# Patient Record
(demographics unavailable — no encounter records)

---

## 2018-01-26 NOTE — EDM.PDOC
ED HPI GENERAL MEDICAL PROBLEM





- General


Chief Complaint: Respiratory Problem


Stated Complaint: ASTHMA ATTACK


Time Seen by Provider: 01/26/18 17:22


Source of Information: Reports: Patient


History Limitations: Reports: No Limitations





- History of Present Illness


INITIAL COMMENTS - FREE TEXT/NARRATIVE: 





29-year-old female presents for evaluation and treatment of wheezing and 

shortness of breath. Patient reports her symptoms started yesterday. Current 

symptoms include wheezing, shortness of breath, fevers, headaches, bodyaches, 

runny nose and a cough. She has a history of asthma and has been using her 

albuterol inhaler. Last use was about 2 hours ago. Denies any nausea, vomiting 

or abdominal pain.





She did have an influenza vaccine this season. 





Works as a .


  ** Generalized


Pain Score (Numeric/FACES): 5





- Related Data


 Allergies











Allergy/AdvReac Type Severity Reaction Status Date / Time


 


No Known Allergies Allergy   Verified 01/26/18 17:27











Home Meds: 


 Home Meds





Albuterol [IJP: Albuterol] 2.5 mg .XX Q4HR #25 unit 01/26/18 [Rx]


Oseltamivir [Tamiflu] 75 mg PO BID #9 cap 01/26/18 [Rx]


Prenatal Vit #108/Iron/FA [Prenatal One Tablet] 1 tab PO DAILY 01/26/18 [History

]











Past Medical History





- Past Health History


Medical/Surgical History: Denies Medical/Surgical History


Respiratory History: Reports: Asthma


Musculoskeletal History: Reports: Arthritis


Neurological History: Reports: Migraines





Social & Family History





- Family History


Family Medical History: Noncontributory





- Tobacco Use


Smoking Status *Q: Never Smoker


Years of Tobacco use: 2


Packs/Tins Daily: 0.1





- Recreational Drug Use


Recreational Drug Use: No





ED ROS GENERAL





- Review of Systems


Review Of Systems: See Below


Constitutional: Reports: Fever, Chills, Malaise, Fatigue, Other (bodyaches)


HEENT: Denies: Ear Pain, Throat Pain


Respiratory: Reports: Shortness of Breath, Wheezing, Cough


GI/Abdominal: Denies: Abdominal Pain, Nausea, Vomiting


Neurological: Reports: Headache





ED EXAM, GENERAL





- Physical Exam


Exam: See Below


Exam Limited By: No Limitations


General Appearance: Alert, WD/WN, Mild Distress


Eye Exam: Bilateral Eye: Conjunctival Injection


Ears: Normal External Exam, Normal Canal, Hearing Grossly Normal, Normal TMs


Nose: Normal Inspection


Throat/Mouth: Normal Inspection, Normal Lips, Normal Voice, No Airway Compromise


Neck: Normal Inspection


Respiratory/Chest: No Respiratory Distress, Wheezing (diffuse inspiratory and 

expiratory wheezing)


Cardiovascular: Normal Peripheral Pulses, No Murmur, Tachycardia


GI/Abdominal: Soft, Non-Tender


Neurological: Alert, Oriented, Normal Cognition


Psychiatric: Normal Affect, Normal Mood


Skin Exam: Diaphoretic, Increased Warmth





Course





- Vital Signs


Last Recorded V/S: 


 Last Vital Signs











Temp  36.6 C   01/26/18 17:24


 


Pulse  111 H  01/26/18 17:24


 


Resp  22 H  01/26/18 17:24


 


BP  139/89   01/26/18 17:24


 


Pulse Ox  99   01/26/18 18:20














- Orders/Labs/Meds


Orders: 


 Active Orders 24 hr











 Category Date Time Status


 


 Peripheral IV Care [RC] .AS DIRECTED Care  01/26/18 17:41 Active


 


 RT Aerosol Therapy [RC] ASDIRECTED Care  01/26/18 17:41 Active


 


 Chest 2V [CR] Stat Exams  01/26/18 17:41 Taken


 


 Peripheral IV Insertion Adult [OM.PC] Routine Oth  01/26/18 17:40 Ordered


 


 Pulse Oximetry Continuous Monitoring [OM.PC] Routine Oth  01/26/18 17:41 Active











Labs: 


 Laboratory Tests











  01/26/18 01/26/18 Range/Units





  17:45 17:45 


 


WBC   6.56  (3.98-10.04)  K/mm3


 


RBC   4.61  (3.98-5.22)  M/mm3


 


Hgb   12.8  (11.2-15.7)  gm/L


 


Hct   37.6  (34.1-44.9)  %


 


MCV   81.6  (79.4-94.8)  fl


 


MCH   27.8  (25.6-32.2)  pg


 


MCHC   34.0  (32.2-35.5)  g/dl


 


RDW Std Deviation   41.9  (36.4-46.3)  fL


 


Plt Count   279  (182-369)  K/mm3


 


MPV   10.1  (9.4-12.3)  fl


 


Neutrophils % (Manual)   73 H  (40-60)  %


 


Band Neutrophils %   1  (0-10)  %


 


Lymphocytes % (Manual)   14 L  (20-40)  %


 


Atypical Lymphs %   0  %


 


Monocytes % (Manual)   6  (2-10)  %


 


Eosinophils % (Manual)   6 H  (0.7-5.8)  %


 


Basophils % (Manual)   0 L  (0.1-1.2)  


 


Platelet Estimate   Adequate  


 


RBC Morph Comment   Normal  


 


Sodium  139   (136-145)  mEq/L


 


Potassium  3.7   (3.5-5.1)  mEq/L


 


Chloride  103   ()  mEq/L


 


Carbon Dioxide  25   (21-32)  mEq/L


 


Anion Gap  14.7   (5-15)  


 


BUN  13   (7-18)  mg/dL


 


Creatinine  1.1 H   (0.55-1.02)  mg/dL


 


Est Cr Clr Drug Dosing  70.64   mL/min


 


Estimated GFR (MDRD)  > 60   (>60)  mL/min


 


BUN/Creatinine Ratio  11.8 L   (14-18)  


 


Glucose  84   ()  mg/dL


 


Calcium  9.2   (8.5-10.1)  mg/dL


 


Total Bilirubin  0.3   (0.2-1.0)  mg/dL


 


AST  19   (15-37)  U/L


 


ALT  22   (14-59)  U/L


 


Alkaline Phosphatase  62   ()  U/L


 


C-Reactive Protein  1.7 H*   (<1.0)  mg/dL


 


Total Protein  7.6   (6.4-8.2)  g/dl


 


Albumin  3.9   (3.4-5.0)  g/dl


 


Globulin  3.7   gm/dL


 


Albumin/Globulin Ratio  1.1   (1-2)  











Meds: 


Medications














Discontinued Medications














Generic Name Dose Route Start Last Admin





  Trade Name Freq  PRN Reason Stop Dose Admin


 


Albuterol  2.5 mg  01/26/18 17:41  01/26/18 18:17





  Proventil Neb Soln  NEB  01/26/18 17:42  2.5 mg





  ONETIME ONE   Administration


 


Sodium Chloride  1,000 mls @ 999 mls/hr  01/26/18 17:40  01/26/18 17:52





  Normal Saline  IV  01/26/18 18:40  999 mls/hr





  ONETIME ONE   Administration


 


Ketorolac Tromethamine  30 mg  01/26/18 17:41  01/26/18 17:54





  Toradol  IVPUSH  01/26/18 17:42  30 mg





  ONETIME ONE   Administration


 


Ondansetron HCl  4 mg  01/26/18 17:41  01/26/18 17:53





  Zofran  IVPUSH  01/26/18 17:42  4 mg





  ONETIME ONE   Administration


 


Oseltamivir Phosphate  75 mg  01/26/18 19:41  01/26/18 20:01





  Tamiflu  PO  01/26/18 19:42  75 mg





  ONETIME ONE   Administration


 


Sodium Chloride  10 ml  01/26/18 17:41  01/26/18 17:54





  Saline Flush  FLUSH   10 ml





  ASDIRECTED PRN   Administration





  Keep Vein Open   














- Re-Assessments/Exams


Free Text/Narrative Re-Assessment/Exam: 





01/26/18 19:42


influenza returned positive for type A.





Discussed disposition. Patient lives in town. Discussed hospitalization She 

would like to go home today. 





I will give the patient tamiflu and albuterol nebs. Close follow-up with PCP 

early next week. 





Discharge instructions as documented. 





Departure





- Departure


Time of Disposition: 19:42


Disposition: Home, Self-Care 01


Condition: Fair


Clinical Impression: 


 Asthma, Influenza








- Discharge Information


Prescriptions: 


Albuterol [IJP: Albuterol] 2.5 mg .XX Q4HR #25 unit


Oseltamivir [Tamiflu] 75 mg PO BID #9 cap


Instructions:  Asthma, Adult, Influenza, Adult


Referrals: 


PCP,None [Primary Care Provider] - 


Forms:  ED Department Discharge, ED Return to Work/School Form


Additional Instructions: 


Albuterol nebs 1 neb every 4-6 hours.





Tamiflu 1 tablet twice a day. Her first dose was given here tonight. Start your 

prescription tomorrow morning.





Follow-up with family understand early next week for recheck of your symptoms.





Rest.





Note given for work





make sure you are drinking plenty of fluids.





Over-the-counter Tylenol and Motrin as needed for headache and body ache relief.





Please return to the ER if your symptoms change or worsen.





- My Orders


Last 24 Hours: 


My Active Orders





01/26/18 17:40


Peripheral IV Insertion Adult [OM.PC] Routine 





01/26/18 17:41


Peripheral IV Care [RC] .AS DIRECTED 


RT Aerosol Therapy [RC] ASDIRECTED 


Chest 2V [CR] Stat 


Pulse Oximetry Continuous Monitoring [OM.PC] Routine 














- Assessment/Plan


Last 24 Hours: 


My Active Orders





01/26/18 17:40


Peripheral IV Insertion Adult [OM.PC] Routine 





01/26/18 17:41


Peripheral IV Care [RC] .AS DIRECTED 


RT Aerosol Therapy [RC] ASDIRECTED 


Chest 2V [CR] Stat 


Pulse Oximetry Continuous Monitoring [OM.PC] Routine

## 2018-01-27 NOTE — CR
Chest:  Two views of the chest were obtained.

 

Comparison: No prior chest x-ray.

 

Heart size and mediastinum are normal.  Central lung markings are 

increased believed to be technique related.  Lungs otherwise are 

clear.  Bony structures are unremarkable.

 

Impression:

1.  Nothing acute is suspected on two-view chest x-ray.

 

Diagnostic code #2